# Patient Record
Sex: FEMALE | Race: WHITE | NOT HISPANIC OR LATINO | URBAN - METROPOLITAN AREA
[De-identification: names, ages, dates, MRNs, and addresses within clinical notes are randomized per-mention and may not be internally consistent; named-entity substitution may affect disease eponyms.]

---

## 2018-02-03 ENCOUNTER — EMERGENCY (EMERGENCY)
Facility: HOSPITAL | Age: 73
LOS: 1 days | End: 2018-02-03
Payer: COMMERCIAL

## 2018-02-03 PROCEDURE — 99285 EMERGENCY DEPT VISIT HI MDM: CPT

## 2018-02-03 PROCEDURE — 72170 X-RAY EXAM OF PELVIS: CPT | Mod: 26

## 2018-02-03 PROCEDURE — 70450 CT HEAD/BRAIN W/O DYE: CPT | Mod: 26

## 2018-02-03 PROCEDURE — 72125 CT NECK SPINE W/O DYE: CPT | Mod: 26

## 2018-02-03 PROCEDURE — 71046 X-RAY EXAM CHEST 2 VIEWS: CPT | Mod: 26

## 2018-02-03 PROCEDURE — 73552 X-RAY EXAM OF FEMUR 2/>: CPT | Mod: 26,RT

## 2018-02-03 PROCEDURE — 72128 CT CHEST SPINE W/O DYE: CPT | Mod: 26

## 2018-02-07 PROBLEM — Z00.00 ENCOUNTER FOR PREVENTIVE HEALTH EXAMINATION: Status: ACTIVE | Noted: 2018-02-07

## 2018-02-15 ENCOUNTER — APPOINTMENT (OUTPATIENT)
Dept: NEUROSURGERY | Facility: CLINIC | Age: 73
End: 2018-02-15
Payer: COMMERCIAL

## 2018-02-15 DIAGNOSIS — E78.5 HYPERLIPIDEMIA, UNSPECIFIED: ICD-10-CM

## 2018-02-15 DIAGNOSIS — Z86.69 PERSONAL HISTORY OF OTHER DISEASES OF THE NERVOUS SYSTEM AND SENSE ORGANS: ICD-10-CM

## 2018-02-15 DIAGNOSIS — Z82.49 FAMILY HISTORY OF ISCHEMIC HEART DISEASE AND OTHER DISEASES OF THE CIRCULATORY SYSTEM: ICD-10-CM

## 2018-02-15 DIAGNOSIS — Z86.39 PERSONAL HISTORY OF OTHER ENDOCRINE, NUTRITIONAL AND METABOLIC DISEASE: ICD-10-CM
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
## 2018-02-15 DIAGNOSIS — Z86.79 PERSONAL HISTORY OF OTHER DISEASES OF THE CIRCULATORY SYSTEM: ICD-10-CM

## 2018-02-15 DIAGNOSIS — Z83.42 FAMILY HISTORY OF FAMILIAL HYPERCHOLESTEROLEMIA: ICD-10-CM

## 2018-02-15 DIAGNOSIS — Z82.3 FAMILY HISTORY OF STROKE: ICD-10-CM

## 2018-02-15 PROCEDURE — 99204 OFFICE O/P NEW MOD 45 MIN: CPT

## 2018-02-15 RX ORDER — LEVOTHYROXINE SODIUM 0.05 MG/1
50 TABLET ORAL
Refills: 0 | Status: ACTIVE | COMMUNITY

## 2018-02-15 RX ORDER — ATORVASTATIN CALCIUM 40 MG/1
40 TABLET, FILM COATED ORAL
Refills: 0 | Status: ACTIVE | COMMUNITY

## 2018-02-15 RX ORDER — METOPROLOL SUCCINATE 25 MG/1
25 TABLET, EXTENDED RELEASE ORAL
Refills: 0 | Status: ACTIVE | COMMUNITY

## 2018-02-15 RX ORDER — POTASSIUM CHLORIDE 20 MEQ
20 TABLET, EXT RELEASE, PARTICLES/CRYSTALS ORAL
Refills: 0 | Status: ACTIVE | COMMUNITY

## 2018-06-15 ENCOUNTER — APPOINTMENT (OUTPATIENT)
Dept: COLORECTAL SURGERY | Facility: CLINIC | Age: 73
End: 2018-06-15
Payer: COMMERCIAL

## 2018-06-15 DIAGNOSIS — K60.2 ANAL FISSURE, UNSPECIFIED: ICD-10-CM

## 2018-06-15 DIAGNOSIS — Z09 ENCOUNTER FOR FOLLOW-UP EXAMINATION AFTER COMPLETED TREATMENT FOR CONDITIONS OTHER THAN MALIGNANT NEOPLASM: ICD-10-CM

## 2018-06-15 DIAGNOSIS — Z86.010 PERSONAL HISTORY OF COLONIC POLYPS: ICD-10-CM

## 2018-06-15 PROCEDURE — 99245 OFF/OP CONSLTJ NEW/EST HI 55: CPT | Mod: 25

## 2018-06-15 PROCEDURE — 46221 LIGATION OF HEMORRHOID(S): CPT

## 2018-06-18 PROBLEM — K60.2 ANAL FISSURE: Status: ACTIVE | Noted: 2018-06-15

## 2018-06-18 PROBLEM — Z86.010 HISTORY OF COLON POLYPS: Status: ACTIVE | Noted: 2018-06-18

## 2018-06-26 ENCOUNTER — APPOINTMENT (OUTPATIENT)
Dept: COLORECTAL SURGERY | Facility: CLINIC | Age: 73
End: 2018-06-26
Payer: COMMERCIAL

## 2018-06-26 PROCEDURE — 45380 COLONOSCOPY AND BIOPSY: CPT

## 2018-06-28 ENCOUNTER — OTHER (OUTPATIENT)
Age: 73
End: 2018-06-28

## 2019-06-24 ENCOUNTER — TRANSCRIPTION ENCOUNTER (OUTPATIENT)
Age: 74
End: 2019-06-24

## 2019-08-13 ENCOUNTER — OTHER (OUTPATIENT)
Age: 74
End: 2019-08-13

## 2019-08-13 RX ORDER — HYDROCORTISONE ACETATE 25 MG/1
25 SUPPOSITORY RECTAL
Qty: 30 | Refills: 3 | Status: ACTIVE | COMMUNITY
Start: 2019-08-13 | End: 1900-01-01

## 2019-08-13 RX ORDER — LIDOCAINE 5 G/100G
5 OINTMENT TOPICAL
Qty: 1 | Refills: 3 | Status: ACTIVE | COMMUNITY
Start: 2019-08-13 | End: 1900-01-01

## 2019-09-02 PROBLEM — Z09 FOLLOW UP: Status: ACTIVE | Noted: 2018-06-15

## 2019-09-04 ENCOUNTER — APPOINTMENT (OUTPATIENT)
Dept: COLORECTAL SURGERY | Facility: CLINIC | Age: 74
End: 2019-09-04

## 2019-09-06 ENCOUNTER — APPOINTMENT (OUTPATIENT)
Dept: COLORECTAL SURGERY | Facility: CLINIC | Age: 74
End: 2019-09-06
Payer: COMMERCIAL

## 2019-09-06 VITALS
BODY MASS INDEX: 26.68 KG/M2 | DIASTOLIC BLOOD PRESSURE: 84 MMHG | WEIGHT: 170 LBS | SYSTOLIC BLOOD PRESSURE: 131 MMHG | TEMPERATURE: 98.1 F | HEART RATE: 72 BPM | HEIGHT: 67 IN

## 2019-09-06 PROCEDURE — 99214 OFFICE O/P EST MOD 30 MIN: CPT | Mod: 25

## 2019-09-06 PROCEDURE — 46600 DIAGNOSTIC ANOSCOPY SPX: CPT

## 2019-09-07 RX ORDER — CHLORTHALIDONE 25 MG/1
25 TABLET ORAL
Qty: 90 | Refills: 0 | Status: ACTIVE | COMMUNITY
Start: 2019-05-01

## 2019-09-07 RX ORDER — IBUPROFEN 600 MG/1
600 TABLET, FILM COATED ORAL
Qty: 20 | Refills: 0 | Status: ACTIVE | COMMUNITY
Start: 2019-07-18

## 2019-09-07 NOTE — PHYSICAL EXAM
[Abdomen Tenderness] : ~T No ~M abdominal tenderness [Abdomen Masses] : No abdominal masses [Tender] : nontender [Tender, Swollen] : tender, swollen [Normal] : was normal [None] : there was no rectal mass  [JVD] : no jugular venous distention  [Gross Blood] : no gross blood [Normal Heart Sounds] : normal heart sounds [Normal Breath Sounds] : Normal breath sounds [No Rash or Lesion] : No rash or lesion [Normal Rate and Rhythm] : normal rate and rhythm [Alert] : alert [Oriented to Person] : oriented to person [Oriented to Time] : oriented to time [Oriented to Place] : oriented to place [Calm] : calm [de-identified] : Looks well in no distress, of stated age. [de-identified] : pupils equal reactive to light normocephalic atraumatic. [de-identified] : moves all 4 extremities appropriately with 5 over 5 strength

## 2019-09-07 NOTE — CONSULT LETTER
[Please see my note below.] : Please see my note below. [Consult Closing:] : Thank you very much for allowing me to participate in the care of this patient.  If you have any questions, please do not hesitate to contact me. [Sincerely,] : Sincerely, [FreeTextEntry3] : Todd Joaquin M.D. FACS, FASCRS

## 2019-09-07 NOTE — PROCEDURE
[FreeTextEntry1] : anoscopy was performed demonstrating multiple internal and external hemorrhoids, grade 1

## 2019-09-07 NOTE — ASSESSMENT
[FreeTextEntry1] : 73-year-old female with internal/external hemorrhoids with anal irritation.recommend high fiber diet, metamucil daily, stool softners, sitzs baths, hydrocortisone cream and suppositories prn

## 2019-09-07 NOTE — HISTORY OF PRESENT ILLNESS
[FreeTextEntry1] : 73-year-old female with history of colon polyps and internal/external hemorrhoids here for followup. Has had occasional anal rectal discomfort, no bleeding

## 2020-01-28 ENCOUNTER — APPOINTMENT (OUTPATIENT)
Dept: COLORECTAL SURGERY | Facility: CLINIC | Age: 75
End: 2020-01-28
Payer: COMMERCIAL

## 2020-01-28 DIAGNOSIS — K61.1 RECTAL ABSCESS: ICD-10-CM

## 2020-01-28 DIAGNOSIS — K64.8 OTHER HEMORRHOIDS: ICD-10-CM

## 2020-01-28 PROCEDURE — 99215 OFFICE O/P EST HI 40 MIN: CPT

## 2020-01-28 NOTE — HISTORY OF PRESENT ILLNESS
[FreeTextEntry1] : 74-year-old female with complaints of rectal swelling, pain, purulent drainage and bleeding. Symptoms of them present for several weeks and worsening

## 2020-01-28 NOTE — PHYSICAL EXAM
[Abdomen Masses] : No abdominal masses [Abdomen Tenderness] : ~T No ~M abdominal tenderness [Fistula] : a fistula [Tender, Swollen] : tender, swollen [Posterior] : posteriorly [None] : there was no rectal mass  [JVD] : no jugular venous distention  [Normal Breath Sounds] : Normal breath sounds [Normal Heart Sounds] : normal heart sounds [Normal Rate and Rhythm] : normal rate and rhythm [No Rash or Lesion] : No rash or lesion [Alert] : alert [Oriented to Person] : oriented to person [Oriented to Place] : oriented to place [Oriented to Time] : oriented to time [Calm] : calm [de-identified] : Looks well in no distress, of stated age. [de-identified] : pupils equal reactive to light normocephalic atraumatic. [de-identified] : moves all 4 extremities appropriately with 5 over 5 strength

## 2020-01-28 NOTE — ASSESSMENT
[FreeTextEntry1] : 74-year-old female with perirectal abscess and fistula and hemorrhoids. Recommend examination under anesthesia incision and drainage of abscess fistulotomy possible seton placement hemorrhoidectomy.Risk and benefits of the surgery have been discussed which include bleeding, infection, sepsis, multiorgan failure, inadvertent injury including hollow viscus, solid organ, ureter neurovascular and neurological structures, DVT PE, heart attack, stroke, hernias, recurrence, Incontinence and death.

## 2020-01-30 ENCOUNTER — OUTPATIENT (OUTPATIENT)
Dept: OUTPATIENT SERVICES | Facility: HOSPITAL | Age: 75
LOS: 1 days | End: 2020-01-30
Payer: COMMERCIAL

## 2020-01-30 VITALS
OXYGEN SATURATION: 100 % | RESPIRATION RATE: 14 BRPM | WEIGHT: 163.8 LBS | HEIGHT: 62 IN | SYSTOLIC BLOOD PRESSURE: 133 MMHG | TEMPERATURE: 98 F | DIASTOLIC BLOOD PRESSURE: 55 MMHG | HEART RATE: 64 BPM

## 2020-01-30 DIAGNOSIS — K61.1 RECTAL ABSCESS: ICD-10-CM

## 2020-01-30 DIAGNOSIS — Z98.890 OTHER SPECIFIED POSTPROCEDURAL STATES: Chronic | ICD-10-CM

## 2020-01-30 DIAGNOSIS — Z01.818 ENCOUNTER FOR OTHER PREPROCEDURAL EXAMINATION: ICD-10-CM

## 2020-01-30 LAB
ANION GAP SERPL CALC-SCNC: 2 MMOL/L — LOW (ref 5–17)
APTT BLD: 34.2 SEC — SIGNIFICANT CHANGE UP (ref 27.5–36.3)
BASOPHILS # BLD AUTO: 0.03 K/UL — SIGNIFICANT CHANGE UP (ref 0–0.2)
BASOPHILS NFR BLD AUTO: 0.6 % — SIGNIFICANT CHANGE UP (ref 0–2)
BUN SERPL-MCNC: 16 MG/DL — SIGNIFICANT CHANGE UP (ref 7–23)
CALCIUM SERPL-MCNC: 9.1 MG/DL — SIGNIFICANT CHANGE UP (ref 8.5–10.1)
CHLORIDE SERPL-SCNC: 103 MMOL/L — SIGNIFICANT CHANGE UP (ref 96–108)
CO2 SERPL-SCNC: 32 MMOL/L — HIGH (ref 22–31)
CREAT SERPL-MCNC: 0.81 MG/DL — SIGNIFICANT CHANGE UP (ref 0.5–1.3)
EOSINOPHIL # BLD AUTO: 0.16 K/UL — SIGNIFICANT CHANGE UP (ref 0–0.5)
EOSINOPHIL NFR BLD AUTO: 3.2 % — SIGNIFICANT CHANGE UP (ref 0–6)
GLUCOSE SERPL-MCNC: 83 MG/DL — SIGNIFICANT CHANGE UP (ref 70–99)
HCT VFR BLD CALC: 38.8 % — SIGNIFICANT CHANGE UP (ref 34.5–45)
HGB BLD-MCNC: 12.6 G/DL — SIGNIFICANT CHANGE UP (ref 11.5–15.5)
IMM GRANULOCYTES NFR BLD AUTO: 0.2 % — SIGNIFICANT CHANGE UP (ref 0–1.5)
INR BLD: 0.98 RATIO — SIGNIFICANT CHANGE UP (ref 0.88–1.16)
LYMPHOCYTES # BLD AUTO: 1.75 K/UL — SIGNIFICANT CHANGE UP (ref 1–3.3)
LYMPHOCYTES # BLD AUTO: 35.3 % — SIGNIFICANT CHANGE UP (ref 13–44)
MCHC RBC-ENTMCNC: 29.7 PG — SIGNIFICANT CHANGE UP (ref 27–34)
MCHC RBC-ENTMCNC: 32.5 GM/DL — SIGNIFICANT CHANGE UP (ref 32–36)
MCV RBC AUTO: 91.5 FL — SIGNIFICANT CHANGE UP (ref 80–100)
MONOCYTES # BLD AUTO: 0.57 K/UL — SIGNIFICANT CHANGE UP (ref 0–0.9)
MONOCYTES NFR BLD AUTO: 11.5 % — SIGNIFICANT CHANGE UP (ref 2–14)
NEUTROPHILS # BLD AUTO: 2.44 K/UL — SIGNIFICANT CHANGE UP (ref 1.8–7.4)
NEUTROPHILS NFR BLD AUTO: 49.2 % — SIGNIFICANT CHANGE UP (ref 43–77)
PLATELET # BLD AUTO: 226 K/UL — SIGNIFICANT CHANGE UP (ref 150–400)
POTASSIUM SERPL-MCNC: 3.7 MMOL/L — SIGNIFICANT CHANGE UP (ref 3.5–5.3)
POTASSIUM SERPL-SCNC: 3.7 MMOL/L — SIGNIFICANT CHANGE UP (ref 3.5–5.3)
PROTHROM AB SERPL-ACNC: 10.9 SEC — SIGNIFICANT CHANGE UP (ref 10–12.9)
RBC # BLD: 4.24 M/UL — SIGNIFICANT CHANGE UP (ref 3.8–5.2)
RBC # FLD: 12.2 % — SIGNIFICANT CHANGE UP (ref 10.3–14.5)
SODIUM SERPL-SCNC: 137 MMOL/L — SIGNIFICANT CHANGE UP (ref 135–145)
WBC # BLD: 4.96 K/UL — SIGNIFICANT CHANGE UP (ref 3.8–10.5)
WBC # FLD AUTO: 4.96 K/UL — SIGNIFICANT CHANGE UP (ref 3.8–10.5)

## 2020-01-30 PROCEDURE — 85730 THROMBOPLASTIN TIME PARTIAL: CPT

## 2020-01-30 PROCEDURE — 80048 BASIC METABOLIC PNL TOTAL CA: CPT

## 2020-01-30 PROCEDURE — 36415 COLL VENOUS BLD VENIPUNCTURE: CPT

## 2020-01-30 PROCEDURE — 93005 ELECTROCARDIOGRAM TRACING: CPT

## 2020-01-30 PROCEDURE — 71046 X-RAY EXAM CHEST 2 VIEWS: CPT

## 2020-01-30 PROCEDURE — 93010 ELECTROCARDIOGRAM REPORT: CPT

## 2020-01-30 PROCEDURE — 85610 PROTHROMBIN TIME: CPT

## 2020-01-30 PROCEDURE — G0463: CPT | Mod: 25

## 2020-01-30 PROCEDURE — 71046 X-RAY EXAM CHEST 2 VIEWS: CPT | Mod: 26

## 2020-01-30 PROCEDURE — 85025 COMPLETE CBC W/AUTO DIFF WBC: CPT

## 2020-01-30 NOTE — H&P PST ADULT - NSANTHOSAYNRD_GEN_A_CORE
No. ABIMBOLA screening performed.  STOP BANG Legend: 0-2 = LOW Risk; 3-4 = INTERMEDIATE Risk; 5-8 = HIGH Risk

## 2020-01-30 NOTE — H&P PST ADULT - HISTORY OF PRESENT ILLNESS
75 yo female presents to PST. Reports having colonoscopy & hemorrhoid banding with Dr Joaquin approx 4 yrs ago. c/o hemorrhoids 8/2019 & consulted with Dr Joaquin. Was treated for hemorrhoids. Went for f/u 1/2020 with Dr Joaquin & was diagnosed with an abscess & fistula. Reports occasional bloody drainage from fistula. Denies fever. 73 yo female presents to PST. Reports having colonoscopy & hemorrhoid banding with Dr Joaquin approx 4 yrs ago. c/o hemorrhoid pain in  8/2019 & consulted with Dr Joaquin. Was treated for hemorrhoids with medications. Went for f/u 1/2020 with Dr Joaquin & was diagnosed with an abscess & fistula. Reports occasional bloody drainage from fistula. Denies fevers.

## 2020-01-30 NOTE — H&P PST ADULT - ASSESSMENT
73 yo female scheduled for exam under anesthesia, incision & drainage of perirectal abscess fistulotomy hemorrhoidectomy possible seton placement on 2/3/2020   with Dr. Joaquin     1. CBC w diff, BMP, PTT, PT/INR  2. EKG  3. Medical evaluation with PCP Dr Mcintyre  4. discussed EZ sponges & PST day of procedure instructions. NPO as per instructions from [facility]SU  5. Instructed to increase po fluids the day before surgery. Instructed to hold aspirin, NSAIDs, fish oil, vitamins & herbal supplements 7 days prior to surgery  6. CXR 75 yo female scheduled for exam under anesthesia, incision & drainage of perirectal abscess fistulotomy hemorrhoidectomy possible seton placement on 2/3/2020   with Dr. Joaquin     1. CBC w diff, BMP, PTT, PT/INR  2. EKG  3. Medical evaluation with PCP Dr Mcintyre  4. discussed EZ sponges & PST day of procedure instructions. NPO as per instructions from [facility]  5. Instructed to increase po fluids the day before surgery. Instructed to hold aspirin, NSAIDs, fish oil, vitamins & herbal supplements 7 days prior to surgery  6. CXR  7. ok to take metoprolo on morning of surgery with sip of water

## 2020-01-30 NOTE — H&P PST ADULT - NSICDXFAMILYHX_GEN_ALL_CORE_FT
FAMILY HISTORY:  Family history of cerebrovascular accident (CVA) in mother  FH: HTN (hypertension), father  FHx: rheumatoid arthritis

## 2020-01-30 NOTE — H&P PST ADULT - NSICDXPASTSURGICALHX_GEN_ALL_CORE_FT
PAST SURGICAL HISTORY:  H/O colonoscopy 2015? with hemorrhoid banding    History of varicose vein stripping left LE

## 2020-01-31 DIAGNOSIS — Z01.818 ENCOUNTER FOR OTHER PREPROCEDURAL EXAMINATION: ICD-10-CM

## 2020-01-31 DIAGNOSIS — K61.1 RECTAL ABSCESS: ICD-10-CM

## 2020-01-31 RX ORDER — ONDANSETRON 8 MG/1
4 TABLET, FILM COATED ORAL ONCE
Refills: 0 | Status: DISCONTINUED | OUTPATIENT
Start: 2020-02-03 | End: 2020-02-03

## 2020-01-31 RX ORDER — FENTANYL CITRATE 50 UG/ML
50 INJECTION INTRAVENOUS
Refills: 0 | Status: DISCONTINUED | OUTPATIENT
Start: 2020-02-03 | End: 2020-02-03

## 2020-01-31 RX ORDER — OXYCODONE HYDROCHLORIDE 5 MG/1
10 TABLET ORAL ONCE
Refills: 0 | Status: DISCONTINUED | OUTPATIENT
Start: 2020-02-03 | End: 2020-02-03

## 2020-01-31 RX ORDER — SODIUM CHLORIDE 9 MG/ML
3 INJECTION INTRAMUSCULAR; INTRAVENOUS; SUBCUTANEOUS EVERY 8 HOURS
Refills: 0 | Status: DISCONTINUED | OUTPATIENT
Start: 2020-02-03 | End: 2020-02-03

## 2020-01-31 RX ORDER — SODIUM CHLORIDE 9 MG/ML
1000 INJECTION, SOLUTION INTRAVENOUS
Refills: 0 | Status: DISCONTINUED | OUTPATIENT
Start: 2020-02-03 | End: 2020-02-03

## 2020-02-03 ENCOUNTER — OUTPATIENT (OUTPATIENT)
Dept: INPATIENT UNIT | Facility: HOSPITAL | Age: 75
LOS: 1 days | Discharge: HOME | End: 2020-02-03
Payer: COMMERCIAL

## 2020-02-03 ENCOUNTER — RESULT REVIEW (OUTPATIENT)
Age: 75
End: 2020-02-03

## 2020-02-03 ENCOUNTER — APPOINTMENT (OUTPATIENT)
Dept: COLORECTAL SURGERY | Facility: HOSPITAL | Age: 75
End: 2020-02-03
Payer: COMMERCIAL

## 2020-02-03 VITALS
RESPIRATION RATE: 16 BRPM | DIASTOLIC BLOOD PRESSURE: 81 MMHG | OXYGEN SATURATION: 98 % | SYSTOLIC BLOOD PRESSURE: 149 MMHG | HEART RATE: 65 BPM | TEMPERATURE: 97 F

## 2020-02-03 VITALS
DIASTOLIC BLOOD PRESSURE: 56 MMHG | HEART RATE: 61 BPM | OXYGEN SATURATION: 99 % | HEIGHT: 62 IN | WEIGHT: 163.8 LBS | TEMPERATURE: 97 F | RESPIRATION RATE: 16 BRPM | SYSTOLIC BLOOD PRESSURE: 133 MMHG

## 2020-02-03 DIAGNOSIS — K61.1 RECTAL ABSCESS: ICD-10-CM

## 2020-02-03 DIAGNOSIS — Z98.890 OTHER SPECIFIED POSTPROCEDURAL STATES: Chronic | ICD-10-CM

## 2020-02-03 DIAGNOSIS — K60.3 ANAL FISTULA: ICD-10-CM

## 2020-02-03 PROCEDURE — 87075 CULTR BACTERIA EXCEPT BLOOD: CPT

## 2020-02-03 PROCEDURE — C9290: CPT

## 2020-02-03 PROCEDURE — 45990 SURG DX EXAM ANORECTAL: CPT | Mod: 59

## 2020-02-03 PROCEDURE — 46060 I&D ISCHIORECTAL/NTRMRL ABSC: CPT

## 2020-02-03 PROCEDURE — 88304 TISSUE EXAM BY PATHOLOGIST: CPT | Mod: 26

## 2020-02-03 PROCEDURE — 88304 TISSUE EXAM BY PATHOLOGIST: CPT

## 2020-02-03 PROCEDURE — 46260 REMOVE IN/EX HEM GROUPS 2+: CPT

## 2020-02-03 PROCEDURE — 87070 CULTURE OTHR SPECIMN AEROBIC: CPT

## 2020-02-03 RX ORDER — CHLORTHALIDONE 50 MG
1 TABLET ORAL
Qty: 0 | Refills: 0 | DISCHARGE

## 2020-02-03 RX ORDER — MULTIVIT-MIN/FERROUS GLUCONATE 9 MG/15 ML
1 LIQUID (ML) ORAL
Qty: 0 | Refills: 0 | DISCHARGE

## 2020-02-03 RX ORDER — METOPROLOL TARTRATE 50 MG
1 TABLET ORAL
Qty: 0 | Refills: 0 | DISCHARGE

## 2020-02-03 RX ORDER — POLYETHYLENE GLYCOL 3350 17 G/17G
0 POWDER, FOR SOLUTION ORAL
Qty: 0 | Refills: 0 | DISCHARGE

## 2020-02-03 RX ORDER — VITAMIN E 100 UNIT
1 CAPSULE ORAL
Qty: 0 | Refills: 0 | DISCHARGE

## 2020-02-03 RX ORDER — POTASSIUM CHLORIDE 20 MEQ
1 PACKET (EA) ORAL
Qty: 0 | Refills: 0 | DISCHARGE

## 2020-02-03 RX ORDER — AMOXICILLIN 250 MG/5ML
1 SUSPENSION, RECONSTITUTED, ORAL (ML) ORAL
Qty: 0 | Refills: 0 | DISCHARGE

## 2020-02-03 RX ORDER — ATORVASTATIN CALCIUM 80 MG/1
1 TABLET, FILM COATED ORAL
Qty: 0 | Refills: 0 | DISCHARGE

## 2020-02-03 RX ORDER — KETOROLAC TROMETHAMINE 30 MG/ML
30 SYRINGE (ML) INJECTION ONCE
Refills: 0 | Status: DISCONTINUED | OUTPATIENT
Start: 2020-02-03 | End: 2020-02-03

## 2020-02-03 RX ORDER — LEVOTHYROXINE SODIUM 125 MCG
1 TABLET ORAL
Qty: 0 | Refills: 0 | DISCHARGE

## 2020-02-03 RX ORDER — ACETAMINOPHEN 500 MG
1000 TABLET ORAL ONCE
Refills: 0 | Status: COMPLETED | OUTPATIENT
Start: 2020-02-03 | End: 2020-02-03

## 2020-02-03 RX ADMIN — Medication 30 MILLIGRAM(S): at 10:06

## 2020-02-03 RX ADMIN — Medication 30 MILLIGRAM(S): at 10:05

## 2020-02-03 RX ADMIN — Medication 1000 MILLIGRAM(S): at 10:06

## 2020-02-03 RX ADMIN — Medication 400 MILLIGRAM(S): at 10:06

## 2020-02-03 NOTE — ASU DISCHARGE PLAN (ADULT/PEDIATRIC) - CARE PROVIDER_API CALL
Todd Joaquin)  ColonRectal Surgery; Surgery  [address] [place] [address]  Phone: [phone]  Fax: [phone]  Follow Up Time:

## 2020-02-03 NOTE — ASU PATIENT PROFILE, ADULT - PMH
Anal fistula    Constipation    Hearing loss    Hemorrhoid    History of constipation    History of rectal abscess    Hypertension    Hypothyroid    Lung nodule    Rectal bleeding    Strep throat    Varicose vein of leg

## 2020-02-03 NOTE — BRIEF OPERATIVE NOTE - NSICDXBRIEFPOSTOP_GEN_ALL_CORE_FT
POST-OP DIAGNOSIS:  Internal hemorrhoids 03-Feb-2020 09:50:13  Marquis Sanchez  Anal fistula 03-Feb-2020 09:49:36  Marquis Sanchez  Perirectal abscess 03-Feb-2020 09:49:41  Marquis Sanchez

## 2020-02-03 NOTE — BRIEF OPERATIVE NOTE - NSICDXBRIEFPREOP_GEN_ALL_CORE_FT
PRE-OP DIAGNOSIS:  Anal fistula 03-Feb-2020 09:48:45  Marquis Sanchez  Perirectal abscess 03-Feb-2020 09:48:52  Marquis Sanchez

## 2020-02-03 NOTE — BRIEF OPERATIVE NOTE - NSICDXBRIEFPROCEDURE_GEN_ALL_CORE_FT
PROCEDURES:  Perianal fistulotomy 03-Feb-2020 09:48:31  Marquis Sanchez  Hemorrhoidectomy, internal and external, involving 1 anal column 03-Feb-2020 09:48:19  Marquis Sanchez  Drainage, abscess, perirectal 03-Feb-2020 09:47:03  Marquis Sanchez

## 2020-02-04 PROBLEM — K64.9 UNSPECIFIED HEMORRHOIDS: Chronic | Status: ACTIVE | Noted: 2020-01-30

## 2020-02-04 PROBLEM — I10 ESSENTIAL (PRIMARY) HYPERTENSION: Chronic | Status: ACTIVE | Noted: 2020-01-30

## 2020-02-04 PROBLEM — R91.1 SOLITARY PULMONARY NODULE: Chronic | Status: ACTIVE | Noted: 2020-01-30

## 2020-02-04 PROBLEM — K62.5 HEMORRHAGE OF ANUS AND RECTUM: Chronic | Status: ACTIVE | Noted: 2020-01-30

## 2020-02-04 PROBLEM — K59.00 CONSTIPATION, UNSPECIFIED: Chronic | Status: ACTIVE | Noted: 2020-01-30

## 2020-02-04 PROBLEM — E03.9 HYPOTHYROIDISM, UNSPECIFIED: Chronic | Status: ACTIVE | Noted: 2020-01-30

## 2020-02-04 PROBLEM — Z87.19 PERSONAL HISTORY OF OTHER DISEASES OF THE DIGESTIVE SYSTEM: Chronic | Status: ACTIVE | Noted: 2020-01-30

## 2020-02-04 PROBLEM — K60.3 ANAL FISTULA: Chronic | Status: ACTIVE | Noted: 2020-01-30

## 2020-02-04 PROBLEM — H91.90 UNSPECIFIED HEARING LOSS, UNSPECIFIED EAR: Chronic | Status: ACTIVE | Noted: 2020-01-30

## 2020-02-04 PROBLEM — I83.90 ASYMPTOMATIC VARICOSE VEINS OF UNSPECIFIED LOWER EXTREMITY: Chronic | Status: ACTIVE | Noted: 2020-01-30

## 2020-02-04 PROBLEM — J02.0 STREPTOCOCCAL PHARYNGITIS: Chronic | Status: ACTIVE | Noted: 2020-01-30

## 2020-02-09 DIAGNOSIS — K64.8 OTHER HEMORRHOIDS: ICD-10-CM

## 2020-02-09 DIAGNOSIS — E03.9 HYPOTHYROIDISM, UNSPECIFIED: ICD-10-CM

## 2020-02-09 DIAGNOSIS — Z91.040 LATEX ALLERGY STATUS: ICD-10-CM

## 2020-02-09 DIAGNOSIS — I10 ESSENTIAL (PRIMARY) HYPERTENSION: ICD-10-CM

## 2020-02-09 DIAGNOSIS — H91.90 UNSPECIFIED HEARING LOSS, UNSPECIFIED EAR: ICD-10-CM

## 2020-02-09 DIAGNOSIS — K64.4 RESIDUAL HEMORRHOIDAL SKIN TAGS: ICD-10-CM

## 2020-02-09 DIAGNOSIS — Z88.2 ALLERGY STATUS TO SULFONAMIDES: ICD-10-CM

## 2020-02-09 DIAGNOSIS — K61.0 ANAL ABSCESS: ICD-10-CM

## 2020-04-22 RX ORDER — AMOXICILLIN AND CLAVULANATE POTASSIUM 875; 125 MG/1; MG/1
875-125 TABLET, COATED ORAL TWICE DAILY
Qty: 20 | Refills: 0 | Status: COMPLETED | COMMUNITY
Start: 2020-04-22 | End: 2020-05-02

## 2020-04-22 RX ORDER — AMOXICILLIN 500 MG/1
500 CAPSULE ORAL
Qty: 21 | Refills: 0 | Status: DISCONTINUED | COMMUNITY
Start: 2019-07-18 | End: 2020-04-22

## 2020-05-01 ENCOUNTER — APPOINTMENT (OUTPATIENT)
Dept: COLORECTAL SURGERY | Facility: CLINIC | Age: 75
End: 2020-05-01
Payer: COMMERCIAL

## 2020-05-01 VITALS
SYSTOLIC BLOOD PRESSURE: 136 MMHG | RESPIRATION RATE: 14 BRPM | HEIGHT: 67 IN | HEART RATE: 80 BPM | DIASTOLIC BLOOD PRESSURE: 78 MMHG | TEMPERATURE: 97.7 F | WEIGHT: 159.38 LBS | BODY MASS INDEX: 25.01 KG/M2

## 2020-05-01 DIAGNOSIS — K60.3 ANAL FISTULA: ICD-10-CM

## 2020-05-01 PROCEDURE — 99024 POSTOP FOLLOW-UP VISIT: CPT

## 2020-05-01 NOTE — HISTORY OF PRESENT ILLNESS
[FreeTextEntry1] : 54-year-old female postop abscess and fistula surgery. Doing very well no complaints

## 2020-05-01 NOTE — PHYSICAL EXAM
[Normal rectal exam] : exam was normal [Fistula] : no fistulas [Excoriation] : no perianal excoriation [Tender, Swollen] : nontender, non-swollen [Normal] : was normal [None] : there was no rectal abscess

## 2022-08-10 NOTE — BRIEF OPERATIVE NOTE - ESTIMATED BLOOD LOSS
Spoke with patient -  [facility] doc:  Dr Pardo - [facility] on [address]    PHONE  [phone]  FAX  [phone]      Letter faxed to Dr Pardo     3

## 2024-01-17 ENCOUNTER — OFFICE (OUTPATIENT)
Facility: LOCATION | Age: 79
Setting detail: OPHTHALMOLOGY
End: 2024-01-17
Payer: MEDICARE

## 2024-01-17 DIAGNOSIS — H25.11: ICD-10-CM

## 2024-01-17 DIAGNOSIS — H35.363: ICD-10-CM

## 2024-01-17 DIAGNOSIS — H40.013: ICD-10-CM

## 2024-01-17 DIAGNOSIS — H16.223: ICD-10-CM

## 2024-01-17 DIAGNOSIS — H31.29: ICD-10-CM

## 2024-01-17 DIAGNOSIS — H35.372: ICD-10-CM

## 2024-01-17 DIAGNOSIS — H26.492: ICD-10-CM

## 2024-01-17 DIAGNOSIS — H43.393: ICD-10-CM

## 2024-01-17 PROCEDURE — 92250 FUNDUS PHOTOGRAPHY W/I&R: CPT | Performed by: OPHTHALMOLOGY

## 2024-01-17 PROCEDURE — 92014 COMPRE OPH EXAM EST PT 1/>: CPT | Performed by: OPHTHALMOLOGY

## 2024-01-17 PROCEDURE — 92083 EXTENDED VISUAL FIELD XM: CPT | Performed by: OPHTHALMOLOGY

## 2024-01-17 PROCEDURE — 92283 EXTND COLOR VISION XM: CPT | Performed by: OPHTHALMOLOGY

## 2024-01-17 ASSESSMENT — CONFRONTATIONAL VISUAL FIELD TEST (CVF)
OD_FINDINGS: FULL
OS_FINDINGS: FULL

## 2024-01-17 ASSESSMENT — SUPERFICIAL PUNCTATE KERATITIS (SPK)
OD_SPK: 1+
OS_SPK: 1+

## 2024-01-18 ENCOUNTER — RX ONLY (RX ONLY)
Age: 79
End: 2024-01-18

## 2024-01-18 ASSESSMENT — REFRACTION_MANIFEST
OD_CYLINDER: -2.00
OS_CYLINDER: -0.75
OD_VA1: 20/25
OS_VA1: 20/20
OS_SPHERE: +0.75
OD_SPHERE: +1.50
OS_AXIS: 120
OD_AXIS: 070

## 2024-01-18 ASSESSMENT — SPHEQUIV_DERIVED
OD_SPHEQUIV: 0.5
OS_SPHEQUIV: 1.125
OS_SPHEQUIV: 0.375
OD_SPHEQUIV: 1.125

## 2024-01-18 ASSESSMENT — REFRACTION_CURRENTRX
OS_CYLINDER: +2.00
OD_OVR_VA: 20/
OS_AXIS: 089
OS_SPHERE: +2.50
OD_CYLINDER: -1.50
OS_ADD: +1.50
OD_VPRISM_DIRECTION: PROGS
OS_OVR_VA: 20/
OD_AXIS: 079
OD_ADD: +2.75
OS_SPHERE: PLANO
OS_OVR_VA: 20/
OD_CYLINDER: +2.00
OS_VPRISM_DIRECTION: PROGS
OD_ADD: +1.50
OS_AXIS: 019
OS_VPRISM_DIRECTION: PROGS
OD_SPHERE: +2.25
OD_SPHERE: PLANO
OS_ADD: +2.75
OD_OVR_VA: 20/
OS_CYLINDER: -1.50
OD_AXIS: 161
OD_VPRISM_DIRECTION: PROGS

## 2024-01-18 ASSESSMENT — REFRACTION_AUTOREFRACTION
OD_CYLINDER: +2.25
OS_CYLINDER: +1.25
OS_AXIS: 010
OD_AXIS: 169
OS_SPHERE: +0.50
OD_SPHERE: 0.00

## 2024-03-02 ENCOUNTER — OFFICE (OUTPATIENT)
Facility: LOCATION | Age: 79
Setting detail: OPHTHALMOLOGY
End: 2024-03-02
Payer: MEDICARE

## 2024-03-02 DIAGNOSIS — H26.492: ICD-10-CM

## 2024-03-02 PROCEDURE — 66821 AFTER CATARACT LASER SURGERY: CPT | Mod: LT | Performed by: OPHTHALMOLOGY

## 2024-03-04 ENCOUNTER — RX ONLY (RX ONLY)
Age: 79
End: 2024-03-04

## 2024-03-04 ASSESSMENT — REFRACTION_CURRENTRX
OS_AXIS: 089
OS_SPHERE: PLANO
OS_CYLINDER: -1.50
OD_AXIS: 161
OS_SPHERE: +2.50
OS_VPRISM_DIRECTION: PROGS
OS_ADD: +2.75
OS_OVR_VA: 20/
OS_ADD: +1.50
OD_CYLINDER: +2.00
OD_AXIS: 079
OS_OVR_VA: 20/
OD_ADD: +1.50
OD_VPRISM_DIRECTION: PROGS
OD_VPRISM_DIRECTION: PROGS
OS_CYLINDER: +2.00
OD_OVR_VA: 20/
OS_VPRISM_DIRECTION: PROGS
OD_SPHERE: PLANO
OD_CYLINDER: -1.50
OD_OVR_VA: 20/
OD_ADD: +2.75
OD_SPHERE: +2.25
OS_AXIS: 019

## 2024-03-04 ASSESSMENT — SPHEQUIV_DERIVED
OS_SPHEQUIV: 0.375
OD_SPHEQUIV: 0.5

## 2024-03-04 ASSESSMENT — REFRACTION_MANIFEST
OS_CYLINDER: -0.75
OS_VA1: 20/20
OS_SPHERE: +0.75
OD_SPHERE: +1.50
OD_AXIS: 070
OD_CYLINDER: -2.00
OS_AXIS: 120
OD_VA1: 20/25

## 2024-03-20 ENCOUNTER — OFFICE (OUTPATIENT)
Facility: LOCATION | Age: 79
Setting detail: OPHTHALMOLOGY
End: 2024-03-20
Payer: MEDICARE

## 2024-03-20 DIAGNOSIS — H52.4: ICD-10-CM

## 2024-03-20 DIAGNOSIS — H26.40: ICD-10-CM

## 2024-03-20 PROBLEM — Z96.1 PSEUDOPHAKIA ; LEFT EYE: Status: ACTIVE | Noted: 2024-03-02

## 2024-03-20 PROBLEM — H16.223 DRY EYE SYNDROME K SICCA;  ,, BOTH EYES: Status: ACTIVE | Noted: 2024-03-20

## 2024-03-20 PROCEDURE — 92015 DETERMINE REFRACTIVE STATE: CPT | Performed by: OPHTHALMOLOGY

## 2024-03-20 PROCEDURE — 99024 POSTOP FOLLOW-UP VISIT: CPT | Performed by: OPHTHALMOLOGY

## 2024-03-21 ASSESSMENT — REFRACTION_CURRENTRX
OS_OVR_VA: 20/
OD_AXIS: 079
OD_VPRISM_DIRECTION: PROGS
OS_OVR_VA: 20/
OD_ADD: +1.50
OD_CYLINDER: -1.50
OS_CYLINDER: +1.75
OD_SPHERE: +0.25
OS_SPHERE: +2.50
OS_AXIS: 016
OD_VPRISM_DIRECTION: PROGS
OS_AXIS: 089
OD_OVR_VA: 20/
OS_VPRISM_DIRECTION: PROGS
OS_ADD: +1.50
OD_SPHERE: +2.25
OS_SPHERE: PLANO
OD_CYLINDER: +1.75
OS_CYLINDER: -1.50
OD_OVR_VA: 20/
OS_ADD: +2.75
OD_AXIS: 168
OD_ADD: +2.75
OS_VPRISM_DIRECTION: PROGS

## 2024-03-21 ASSESSMENT — REFRACTION_MANIFEST
OD_AXIS: 170
OD_CYLINDER: +2.50
OS_SPHERE: -0.50
OS_AXIS: 180
OS_ADD: +3.00
OD_SPHERE: -0.50
OD_VA1: 20/25+1
OS_VA1: 20/20
OU_VA: 20/20
OD_ADD: +3.00
OS_CYLINDER: +2.50

## 2024-03-21 ASSESSMENT — SPHEQUIV_DERIVED
OS_SPHEQUIV: 0.75
OD_SPHEQUIV: 0.75

## 2024-09-23 ENCOUNTER — OFFICE (OUTPATIENT)
Facility: LOCATION | Age: 79
Setting detail: OPHTHALMOLOGY
End: 2024-09-23
Payer: MEDICARE

## 2024-09-23 DIAGNOSIS — H25.11: ICD-10-CM

## 2024-09-23 DIAGNOSIS — H16.223: ICD-10-CM

## 2024-09-23 DIAGNOSIS — H40.013: ICD-10-CM

## 2024-09-23 PROCEDURE — 92014 COMPRE OPH EXAM EST PT 1/>: CPT | Performed by: OPHTHALMOLOGY

## 2024-09-23 PROCEDURE — 92133 CPTRZD OPH DX IMG PST SGM ON: CPT | Performed by: OPHTHALMOLOGY

## 2024-09-23 ASSESSMENT — CONFRONTATIONAL VISUAL FIELD TEST (CVF)
OD_FINDINGS: FULL
OS_FINDINGS: FULL

## 2025-03-24 ENCOUNTER — OFFICE (OUTPATIENT)
Facility: LOCATION | Age: 80
Setting detail: OPHTHALMOLOGY
End: 2025-03-24
Payer: MEDICARE

## 2025-03-24 ENCOUNTER — RX ONLY (RX ONLY)
Age: 80
End: 2025-03-24

## 2025-03-24 DIAGNOSIS — H40.013: ICD-10-CM

## 2025-03-24 DIAGNOSIS — H25.11: ICD-10-CM

## 2025-03-24 DIAGNOSIS — H16.223: ICD-10-CM

## 2025-03-24 PROCEDURE — 92083 EXTENDED VISUAL FIELD XM: CPT | Performed by: OPHTHALMOLOGY

## 2025-03-24 PROCEDURE — 92283 EXTND COLOR VISION XM: CPT | Performed by: OPHTHALMOLOGY

## 2025-03-24 PROCEDURE — 92250 FUNDUS PHOTOGRAPHY W/I&R: CPT | Performed by: OPHTHALMOLOGY

## 2025-03-24 PROCEDURE — 92014 COMPRE OPH EXAM EST PT 1/>: CPT | Performed by: OPHTHALMOLOGY

## 2025-03-24 ASSESSMENT — CONFRONTATIONAL VISUAL FIELD TEST (CVF)
OS_FINDINGS: FULL
OD_FINDINGS: FULL

## 2025-03-24 ASSESSMENT — TONOMETRY
OS_IOP_MMHG: 16
OD_IOP_MMHG: 17

## 2025-03-24 ASSESSMENT — SUPERFICIAL PUNCTATE KERATITIS (SPK)
OD_SPK: 1+
OS_SPK: 1+

## 2025-03-25 PROBLEM — H26.492 PCO ; LEFT EYE: Status: ACTIVE | Noted: 2025-03-24

## 2025-03-25 ASSESSMENT — REFRACTION_CURRENTRX
OD_ADD: +3.00
OD_VPRISM_DIRECTION: PROGS
OS_OVR_VA: 20/
OD_SPHERE: +0.25
OS_CYLINDER: +1.75
OS_CYLINDER: +2.50
OS_SPHERE: PLANO
OD_AXIS: 170
OS_VPRISM_DIRECTION: PROGS
OS_ADD: +3.00
OD_AXIS: 079
OD_ADD: +1.50
OS_AXIS: 180
OS_VPRISM_DIRECTION: PROGS
OS_AXIS: 089
OD_CYLINDER: +1.75
OS_ADD: +2.75
OD_CYLINDER: +2.50
OD_VPRISM_DIRECTION: PROGS
OS_OVR_VA: 20/
OS_OVR_VA: 20/
OS_SPHERE: -0.50
OD_SPHERE: +2.25
OS_AXIS: 016
OD_VPRISM_DIRECTION: PROGS
OS_ADD: +1.50
OD_OVR_VA: 20/
OD_SPHERE: -0.50
OD_OVR_VA: 20/
OD_CYLINDER: -1.50
OD_AXIS: 168
OS_VPRISM_DIRECTION: PROGS
OS_CYLINDER: -1.50
OD_OVR_VA: 20/
OD_ADD: +2.75
OS_SPHERE: +2.50

## 2025-03-25 ASSESSMENT — REFRACTION_MANIFEST
OD_CYLINDER: +2.50
OD_SPHERE: -0.50
OS_CYLINDER: +2.50
OU_VA: 20/20
OS_VA1: 20/20
OD_VA1: 20/25+1
OS_SPHERE: -0.50
OD_AXIS: 170
OS_AXIS: 180
OS_ADD: +3.00
OD_ADD: +3.00

## 2025-03-25 ASSESSMENT — KERATOMETRY
OD_K2POWER_DIOPTERS: 47.00
OD_AXISANGLE_DEGREES: 165
OD_K1POWER_DIOPTERS: 45.50
OS_K1POWER_DIOPTERS: 45.75
OS_K2POWER_DIOPTERS: 47.00
OS_AXISANGLE_DEGREES: 006

## 2025-03-25 ASSESSMENT — REFRACTION_AUTOREFRACTION
OD_CYLINDER: +1.75
OS_AXIS: 180
OS_SPHERE: -0.75
OS_CYLINDER: +2.50
OD_AXIS: 176
OD_SPHERE: +0.25

## 2025-03-25 ASSESSMENT — VISUAL ACUITY
OD_BCVA: 20/25
OS_BCVA: 20/40+2